# Patient Record
Sex: FEMALE | Race: WHITE | NOT HISPANIC OR LATINO | URBAN - METROPOLITAN AREA
[De-identification: names, ages, dates, MRNs, and addresses within clinical notes are randomized per-mention and may not be internally consistent; named-entity substitution may affect disease eponyms.]

---

## 2018-05-23 ENCOUNTER — OUTPATIENT (OUTPATIENT)
Dept: OUTPATIENT SERVICES | Facility: HOSPITAL | Age: 63
LOS: 1 days | End: 2018-05-23

## 2018-05-24 ENCOUNTER — APPOINTMENT (OUTPATIENT)
Dept: CARDIOLOGY | Facility: CLINIC | Age: 63
End: 2018-05-24
Payer: COMMERCIAL

## 2018-05-24 VITALS
WEIGHT: 128 LBS | BODY MASS INDEX: 25.13 KG/M2 | HEIGHT: 60 IN | DIASTOLIC BLOOD PRESSURE: 88 MMHG | SYSTOLIC BLOOD PRESSURE: 148 MMHG | HEART RATE: 60 BPM

## 2018-05-24 DIAGNOSIS — Z83.79 FAMILY HISTORY OF OTHER DISEASES OF THE DIGESTIVE SYSTEM: ICD-10-CM

## 2018-05-24 DIAGNOSIS — Z78.9 OTHER SPECIFIED HEALTH STATUS: ICD-10-CM

## 2018-05-24 DIAGNOSIS — Z87.891 PERSONAL HISTORY OF NICOTINE DEPENDENCE: ICD-10-CM

## 2018-05-24 DIAGNOSIS — Z82.5 FAMILY HISTORY OF ASTHMA AND OTHER CHRONIC LOWER RESPIRATORY DISEASES: ICD-10-CM

## 2018-05-24 DIAGNOSIS — Z80.7 FAMILY HISTORY OF OTHER MALIGNANT NEOPLASMS OF LYMPHOID, HEMATOPOIETIC AND RELATED TISSUES: ICD-10-CM

## 2018-05-24 DIAGNOSIS — Z81.8 FAMILY HISTORY OF OTHER MENTAL AND BEHAVIORAL DISORDERS: ICD-10-CM

## 2018-05-24 PROCEDURE — 99214 OFFICE O/P EST MOD 30 MIN: CPT

## 2018-05-29 ENCOUNTER — OUTPATIENT (OUTPATIENT)
Dept: OUTPATIENT SERVICES | Facility: HOSPITAL | Age: 63
LOS: 1 days | End: 2018-05-29

## 2018-05-31 ENCOUNTER — APPOINTMENT (OUTPATIENT)
Dept: CARDIOLOGY | Facility: CLINIC | Age: 63
End: 2018-05-31
Payer: COMMERCIAL

## 2018-05-31 PROCEDURE — 93306 TTE W/DOPPLER COMPLETE: CPT

## 2018-05-31 PROCEDURE — 93880 EXTRACRANIAL BILAT STUDY: CPT

## 2018-06-01 ENCOUNTER — OUTPATIENT (OUTPATIENT)
Dept: OUTPATIENT SERVICES | Facility: HOSPITAL | Age: 63
LOS: 1 days | End: 2018-06-01

## 2018-06-11 ENCOUNTER — MEDICATION RENEWAL (OUTPATIENT)
Age: 63
End: 2018-06-11

## 2018-09-05 ENCOUNTER — APPOINTMENT (OUTPATIENT)
Dept: CARDIOLOGY | Facility: CLINIC | Age: 63
End: 2018-09-05
Payer: COMMERCIAL

## 2018-09-05 ENCOUNTER — RECORD ABSTRACTING (OUTPATIENT)
Age: 63
End: 2018-09-05

## 2018-09-05 VITALS
OXYGEN SATURATION: 97 % | HEART RATE: 75 BPM | DIASTOLIC BLOOD PRESSURE: 82 MMHG | HEIGHT: 60 IN | BODY MASS INDEX: 25.13 KG/M2 | SYSTOLIC BLOOD PRESSURE: 152 MMHG | WEIGHT: 128 LBS

## 2018-09-05 PROCEDURE — 99214 OFFICE O/P EST MOD 30 MIN: CPT

## 2018-09-05 PROCEDURE — 93000 ELECTROCARDIOGRAM COMPLETE: CPT

## 2018-09-06 ENCOUNTER — OUTPATIENT (OUTPATIENT)
Dept: OUTPATIENT SERVICES | Facility: HOSPITAL | Age: 63
LOS: 1 days | End: 2018-09-06

## 2018-09-10 ENCOUNTER — OUTPATIENT (OUTPATIENT)
Dept: OUTPATIENT SERVICES | Facility: HOSPITAL | Age: 63
LOS: 1 days | End: 2018-09-10

## 2018-09-12 ENCOUNTER — APPOINTMENT (OUTPATIENT)
Dept: CARDIOLOGY | Facility: CLINIC | Age: 63
End: 2018-09-12
Payer: COMMERCIAL

## 2018-09-14 PROCEDURE — 93224 XTRNL ECG REC UP TO 48 HRS: CPT

## 2018-09-17 ENCOUNTER — APPOINTMENT (OUTPATIENT)
Dept: CARDIOLOGY | Facility: CLINIC | Age: 63
End: 2018-09-17

## 2018-09-18 ENCOUNTER — RECORD ABSTRACTING (OUTPATIENT)
Age: 63
End: 2018-09-18

## 2018-09-18 ENCOUNTER — APPOINTMENT (OUTPATIENT)
Dept: CARDIOLOGY | Facility: CLINIC | Age: 63
End: 2018-09-18
Payer: COMMERCIAL

## 2018-09-18 VITALS
SYSTOLIC BLOOD PRESSURE: 120 MMHG | BODY MASS INDEX: 25.52 KG/M2 | OXYGEN SATURATION: 97 % | HEART RATE: 70 BPM | WEIGHT: 130 LBS | HEIGHT: 60 IN | DIASTOLIC BLOOD PRESSURE: 80 MMHG

## 2018-09-18 DIAGNOSIS — M25.552 PAIN IN LEFT HIP: ICD-10-CM

## 2018-09-18 PROCEDURE — 99214 OFFICE O/P EST MOD 30 MIN: CPT | Mod: 25

## 2018-09-18 PROCEDURE — 93015 CV STRESS TEST SUPVJ I&R: CPT

## 2018-09-18 PROCEDURE — A9502: CPT

## 2018-09-18 PROCEDURE — 78452 HT MUSCLE IMAGE SPECT MULT: CPT

## 2018-09-20 ENCOUNTER — APPOINTMENT (OUTPATIENT)
Dept: CARDIOLOGY | Facility: CLINIC | Age: 63
End: 2018-09-20
Payer: COMMERCIAL

## 2018-09-24 ENCOUNTER — OUTPATIENT (OUTPATIENT)
Dept: OUTPATIENT SERVICES | Facility: HOSPITAL | Age: 63
LOS: 1 days | End: 2018-09-24

## 2018-09-24 ENCOUNTER — INPATIENT (INPATIENT)
Facility: HOSPITAL | Age: 63
LOS: 2 days | Discharge: HOME HEALTH | End: 2018-09-27
Payer: COMMERCIAL

## 2018-09-24 PROCEDURE — 73501 X-RAY EXAM HIP UNI 1 VIEW: CPT | Mod: 26,LT

## 2018-09-25 ENCOUNTER — OUTPATIENT (OUTPATIENT)
Dept: OUTPATIENT SERVICES | Facility: HOSPITAL | Age: 63
LOS: 1 days | End: 2018-09-25

## 2018-09-26 ENCOUNTER — OUTPATIENT (OUTPATIENT)
Dept: OUTPATIENT SERVICES | Facility: HOSPITAL | Age: 63
LOS: 1 days | End: 2018-09-26

## 2018-09-27 ENCOUNTER — OUTPATIENT (OUTPATIENT)
Dept: OUTPATIENT SERVICES | Facility: HOSPITAL | Age: 63
LOS: 1 days | End: 2018-09-27

## 2018-10-16 ENCOUNTER — OUTPATIENT (OUTPATIENT)
Dept: OUTPATIENT SERVICES | Facility: HOSPITAL | Age: 63
LOS: 1 days | Discharge: HOME | End: 2018-10-16

## 2019-01-04 ENCOUNTER — RX RENEWAL (OUTPATIENT)
Age: 64
End: 2019-01-04

## 2019-05-20 ENCOUNTER — APPOINTMENT (OUTPATIENT)
Dept: RADIOLOGY | Facility: CLINIC | Age: 64
End: 2019-05-20
Payer: COMMERCIAL

## 2019-05-20 PROCEDURE — 72050 X-RAY EXAM NECK SPINE 4/5VWS: CPT

## 2019-05-23 ENCOUNTER — TRANSCRIPTION ENCOUNTER (OUTPATIENT)
Age: 64
End: 2019-05-23

## 2019-10-22 ENCOUNTER — APPOINTMENT (OUTPATIENT)
Dept: CARDIOLOGY | Facility: CLINIC | Age: 64
End: 2019-10-22
Payer: MEDICARE

## 2019-10-22 ENCOUNTER — NON-APPOINTMENT (OUTPATIENT)
Age: 64
End: 2019-10-22

## 2019-10-22 VITALS
SYSTOLIC BLOOD PRESSURE: 130 MMHG | HEART RATE: 54 BPM | DIASTOLIC BLOOD PRESSURE: 80 MMHG | WEIGHT: 125 LBS | HEIGHT: 60 IN | BODY MASS INDEX: 24.54 KG/M2

## 2019-10-22 PROCEDURE — 93000 ELECTROCARDIOGRAM COMPLETE: CPT

## 2019-10-22 PROCEDURE — 99214 OFFICE O/P EST MOD 30 MIN: CPT

## 2019-10-22 NOTE — REASON FOR VISIT
[Follow-Up - Clinic] : a clinic follow-up of [FreeTextEntry2] : HTN, chronic hip pain left NASIMA Sept 2018

## 2019-10-22 NOTE — PHYSICAL EXAM
[General Appearance - Well Developed] : well developed [Normal Appearance] : normal appearance [Well Groomed] : well groomed [General Appearance - Well Nourished] : well nourished [No Deformities] : no deformities [General Appearance - In No Acute Distress] : no acute distress [Normal Conjunctiva] : the conjunctiva exhibited no abnormalities [Eyelids - No Xanthelasma] : the eyelids demonstrated no xanthelasmas [Normal Oral Mucosa] : normal oral mucosa [No Oral Pallor] : no oral pallor [No Oral Cyanosis] : no oral cyanosis [Respiration, Rhythm And Depth] : normal respiratory rhythm and effort [Exaggerated Use Of Accessory Muscles For Inspiration] : no accessory muscle use [Auscultation Breath Sounds / Voice Sounds] : lungs were clear to auscultation bilaterally [Heart Rate And Rhythm] : heart rate and rhythm were normal [Heart Sounds] : normal S1 and S2 [Murmurs] : no murmurs present [Abnormal Walk] : normal gait [Gait - Sufficient For Exercise Testing] : the gait was sufficient for exercise testing [Nail Clubbing] : no clubbing of the fingernails [Cyanosis, Localized] : no localized cyanosis [Petechial Hemorrhages (___cm)] : no petechial hemorrhages [] : no rash [Skin Color & Pigmentation] : normal skin color and pigmentation [No Venous Stasis] : no venous stasis [Skin Lesions] : no skin lesions [No Xanthoma] : no  xanthoma was observed [No Skin Ulcers] : no skin ulcer [Oriented To Time, Place, And Person] : oriented to person, place, and time [Affect] : the affect was normal [Mood] : the mood was normal [No Anxiety] : not feeling anxious [FreeTextEntry1] : diminished carotid impulses.

## 2019-10-22 NOTE — HISTORY OF PRESENT ILLNESS
[FreeTextEntry1] : Kathrin is a 64-year-old female with history of hypertension on amlodipine and Toprol, chronic hip pain left NASIMA September 2018, scoliosis.\par \par Cardiovascular review of symptoms is negative for exertional chest pain, dyspnea, palpitations, dizziness or syncope.  No PND or orthopnea leg edema.  No bleeding or black stool.\par \par Patient has hip and back pain which limits her exercise capacity.  Patient is walking 15 minutes with exertional chest pain.\par \par Lexascan Myoview stress test September 2018, LVEF 62% normal perfusion, no ischemic EKG response, no chest pain, baseline sinus rhythm PRWP, NSST\par \par Echocardiogram May 2018, LVEF 65% mild diastolic dysfunction normal Doppler, normal RVSP\par \par Carotid Doppler May 2018 mild nonobstructive plaque\par \par

## 2019-10-22 NOTE — ADDENDUM
[FreeTextEntry1] : Patient had echocardiography and carotid sonography today, pulmonary this reveals normal LV function no significant valvular heart disease and mild bilateral carotid atherosclerosis. As such she should proceed with her planned surgery as detailed above.

## 2019-10-22 NOTE — ASSESSMENT
[FreeTextEntry1] : \ha Pinon is a 64-year-old female with medical history detailed above and active medical issues including:\par \par -No exertional symptoms, normal perfusion Myoview stress test normal LVEF September 2018\par \par - Hypertension at BP goal <130/80 on amlodipine and Toprol.  Medication renewal today, repeat labs ordered.\par \par - Chronic back pain, scoliosis, limited aerobic activity\par \par - Left NASIMA September 2018\par \par Advised patient to follow active lifestyle with regular cardiovascular exercise. Patient educated on lifestyle and diet modification with low sodium low fat diet and avoidance of excessive alcohol. Patient is aware to call with any symptoms or concerns. \par \par Patient will be seen in cardiology followup in one year at patient request. \par \ha Pinon will followup with Calin WETSON for primary care.  \par \par

## 2019-10-23 ENCOUNTER — OUTPATIENT (OUTPATIENT)
Dept: OUTPATIENT SERVICES | Facility: HOSPITAL | Age: 64
LOS: 1 days | End: 2019-10-23
Payer: MEDICARE

## 2019-10-23 PROCEDURE — 72148 MRI LUMBAR SPINE W/O DYE: CPT | Mod: 26

## 2019-11-06 ENCOUNTER — OUTPATIENT (OUTPATIENT)
Dept: OUTPATIENT SERVICES | Facility: HOSPITAL | Age: 64
LOS: 1 days | End: 2019-11-06

## 2020-01-09 ENCOUNTER — APPOINTMENT (OUTPATIENT)
Dept: CARDIOLOGY | Facility: CLINIC | Age: 65
End: 2020-01-09
Payer: MEDICARE

## 2020-01-09 PROCEDURE — 93306 TTE W/DOPPLER COMPLETE: CPT

## 2020-10-13 ENCOUNTER — APPOINTMENT (OUTPATIENT)
Dept: CARDIOLOGY | Facility: CLINIC | Age: 65
End: 2020-10-13
Payer: MEDICARE

## 2020-10-27 ENCOUNTER — NON-APPOINTMENT (OUTPATIENT)
Age: 65
End: 2020-10-27

## 2020-10-27 ENCOUNTER — APPOINTMENT (OUTPATIENT)
Dept: CARDIOLOGY | Facility: CLINIC | Age: 65
End: 2020-10-27
Payer: MEDICARE

## 2020-10-27 VITALS
SYSTOLIC BLOOD PRESSURE: 142 MMHG | OXYGEN SATURATION: 97 % | TEMPERATURE: 97.5 F | HEART RATE: 71 BPM | DIASTOLIC BLOOD PRESSURE: 84 MMHG | WEIGHT: 126 LBS | BODY MASS INDEX: 24.74 KG/M2 | HEIGHT: 60 IN

## 2020-10-27 PROCEDURE — 93000 ELECTROCARDIOGRAM COMPLETE: CPT

## 2020-10-27 NOTE — HISTORY OF PRESENT ILLNESS
[FreeTextEntry1] : Kathrin is a 65-year-old female with history of hypertension on amlodipine and Toprol, chronic hip pain left NASIMA September 2018, scoliosis.\par \par Cardiovascular review of symptoms is negative for exertional chest pain, dyspnea, palpitations, dizziness or syncope.  No PND or orthopnea leg edema.  No bleeding or black stool.\par \par Patient has hip and back pain which limits her exercise capacity.  Patient is walking 15 minutes with exertional chest pain. 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  \par \par Echocardiogram Jan 2020, LVEF 60 to 65%, mild diastolic dysfunction, normal wall thickness, mild MR, normal RVSP\par \par Lexascan Myoview stress test September 2018, LVEF 62% normal perfusion, no ischemic EKG response, no chest pain, baseline sinus rhythm PRWP, NSST\par \par Echocardiogram May 2018, LVEF 65% mild diastolic dysfunction normal Doppler, normal RVSP\par \par Carotid Doppler May 2018 mild nonobstructive plaque\par \par

## 2020-10-27 NOTE — PHYSICAL EXAM
[General Appearance - Well Developed] : well developed [Normal Appearance] : normal appearance [Well Groomed] : well groomed [General Appearance - Well Nourished] : well nourished [No Deformities] : no deformities [General Appearance - In No Acute Distress] : no acute distress [Normal Conjunctiva] : the conjunctiva exhibited no abnormalities [Eyelids - No Xanthelasma] : the eyelids demonstrated no xanthelasmas [Normal Oral Mucosa] : normal oral mucosa [No Oral Pallor] : no oral pallor [No Oral Cyanosis] : no oral cyanosis [Respiration, Rhythm And Depth] : normal respiratory rhythm and effort [Exaggerated Use Of Accessory Muscles For Inspiration] : no accessory muscle use [Auscultation Breath Sounds / Voice Sounds] : lungs were clear to auscultation bilaterally [Heart Rate And Rhythm] : heart rate and rhythm were normal [Heart Sounds] : normal S1 and S2 [Murmurs] : no murmurs present [Abnormal Walk] : normal gait [Gait - Sufficient For Exercise Testing] : the gait was sufficient for exercise testing [Nail Clubbing] : no clubbing of the fingernails [Cyanosis, Localized] : no localized cyanosis [Petechial Hemorrhages (___cm)] : no petechial hemorrhages [Skin Color & Pigmentation] : normal skin color and pigmentation [] : no rash [No Venous Stasis] : no venous stasis [Skin Lesions] : no skin lesions [No Skin Ulcers] : no skin ulcer [No Xanthoma] : no  xanthoma was observed [Oriented To Time, Place, And Person] : oriented to person, place, and time [Affect] : the affect was normal [Mood] : the mood was normal [No Anxiety] : not feeling anxious [FreeTextEntry1] : diminished carotid impulses.

## 2020-10-27 NOTE — ASSESSMENT
[FreeTextEntry1] : Kathrin is a 65-year-old female with medical history detailed above and active medical issues including:\par \par - No exertional symptoms, normal perfusion Myoview stress test normal LVEF September 2018.  In the setting of Covid 19 pandemic we will discuss the need for stress testing next office visit.\par \par - Hypertension Patient will follow home BPs daily over the next week and call office to confirm average home BPs at goal on amlodipine and Toprol.  Medication renewal today, repeat labs ordered.\par \par - Chronic back pain, scoliosis, limited aerobic activity\par \par - Left NASIMA September 2018\par \par Advised patient to follow active lifestyle with regular cardiovascular exercise. Patient educated on lifestyle and diet modification with low sodium low fat diet and avoidance of excessive alcohol. Patient is aware to call with any symptoms or concerns. \par \par Patient will be seen in cardiology followup in one year at patient request. \par \par Kathrin will followup with Calin WESTON for primary care.  \par \par

## 2021-03-26 ENCOUNTER — TRANSCRIPTION ENCOUNTER (OUTPATIENT)
Age: 66
End: 2021-03-26

## 2021-04-08 ENCOUNTER — APPOINTMENT (OUTPATIENT)
Dept: CARDIOLOGY | Facility: CLINIC | Age: 66
End: 2021-04-08
Payer: MEDICARE

## 2021-04-08 PROCEDURE — 93880 EXTRACRANIAL BILAT STUDY: CPT

## 2021-04-08 PROCEDURE — 93979 VASCULAR STUDY: CPT

## 2021-04-08 PROCEDURE — 93306 TTE W/DOPPLER COMPLETE: CPT

## 2021-04-14 ENCOUNTER — APPOINTMENT (OUTPATIENT)
Dept: CARDIOLOGY | Facility: CLINIC | Age: 66
End: 2021-04-14

## 2021-04-21 ENCOUNTER — NON-APPOINTMENT (OUTPATIENT)
Age: 66
End: 2021-04-21

## 2021-11-12 ENCOUNTER — NON-APPOINTMENT (OUTPATIENT)
Age: 66
End: 2021-11-12

## 2021-11-12 ENCOUNTER — APPOINTMENT (OUTPATIENT)
Dept: CARDIOLOGY | Facility: CLINIC | Age: 66
End: 2021-11-12
Payer: MEDICARE

## 2021-11-12 VITALS
OXYGEN SATURATION: 98 % | BODY MASS INDEX: 24.54 KG/M2 | HEIGHT: 60 IN | HEART RATE: 66 BPM | WEIGHT: 125 LBS | DIASTOLIC BLOOD PRESSURE: 72 MMHG | SYSTOLIC BLOOD PRESSURE: 154 MMHG

## 2021-11-12 DIAGNOSIS — Z87.898 PERSONAL HISTORY OF OTHER SPECIFIED CONDITIONS: ICD-10-CM

## 2021-11-12 DIAGNOSIS — Z00.00 ENCOUNTER FOR GENERAL ADULT MEDICAL EXAMINATION W/OUT ABNORMAL FINDINGS: ICD-10-CM

## 2021-11-12 PROCEDURE — 99215 OFFICE O/P EST HI 40 MIN: CPT

## 2021-11-12 RX ORDER — MESALAMINE 4 G/60ML
4 ENEMA RECTAL
Qty: 1680 | Refills: 0 | Status: DISCONTINUED | COMMUNITY
Start: 2020-05-20 | End: 2021-11-12

## 2021-11-12 NOTE — ASSESSMENT
[FreeTextEntry1] : Prior cardiovascular test\par Echocardiogram Jan 2020, LVEF 60 to 65%, mild diastolic dysfunction, normal wall thickness, mild MR, normal RVSP\par \par Lexascan Myoview stress test September 2018, LVEF 62% normal perfusion, no ischemic EKG response, no chest pain, baseline sinus rhythm PRWP, NSST\par \par Echocardiogram May 2018, LVEF 65% mild diastolic dysfunction normal Doppler, normal RVSP\par \par Carotid Doppler May 2018 mild nonobstructive plaque\par \par Reviewed November 12, 2021\par Echocardiogram, carotid Doppler study, abdominal aortic ultrasound from April 8, 2021 and labs from January 26, 2021 were reviewed\par \par \par \par

## 2021-11-12 NOTE — PHYSICAL EXAM
[Well Developed] : well developed [Well Nourished] : well nourished [No Acute Distress] : no acute distress [Normal Conjunctiva] : normal conjunctiva [Normal Venous Pressure] : normal venous pressure [No Carotid Bruit] : no carotid bruit [Normal S1, S2] : normal S1, S2 [No Murmur] : no murmur [No Rub] : no rub [No Gallop] : no gallop [Clear Lung Fields] : clear lung fields [Good Air Entry] : good air entry [No Respiratory Distress] : no respiratory distress  [Soft] : abdomen soft [Non Tender] : non-tender [No Masses/organomegaly] : no masses/organomegaly [Normal Bowel Sounds] : normal bowel sounds [Normal Gait] : normal gait [No Edema] : no edema [No Cyanosis] : no cyanosis [No Clubbing] : no clubbing [No Varicosities] : no varicosities [Normal Radial B/L] : normal radial B/L [Normal DP B/L] : normal DP B/L [No Rash] : no rash [No Skin Lesions] : no skin lesions [Moves all extremities] : moves all extremities [No Focal Deficits] : no focal deficits [Normal Speech] : normal speech [Alert and Oriented] : alert and oriented [Normal memory] : normal memory

## 2021-11-12 NOTE — HISTORY OF PRESENT ILLNESS
[FreeTextEntry1] : Kathrin is a 66-year-old female with history of hypertension on amlodipine and Toprol, chronic hip pain left NASIMA September 2018, scoliosis.\par \par Cardiovascular review of symptoms is negative for exertional chest pain, dyspnea, palpitations, dizziness or syncope.  No PND or orthopnea leg edema.  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

## 2021-11-12 NOTE — DISCUSSION/SUMMARY
[FreeTextEntry1] : Kathrin is a 66-year-old female with medical history detailed above and active medical issues including:\par \par \par - Hypertension.  Essential.  No history of CHF.  No CKD.  Non-smoker.  Elevated to office visit in a row.  Goal less than 130/80.  Increase amlodipine 10 mg as she does not want to change the medications.  Side effects reviewed.  She will contact us immediately if there are any significant side effects which includes dizziness lightheadedness ankle edema etc.  In that case she would benefit from amlodipine/ACE inhibitor.\par Low-salt diet.  Lower alcohol intake to 1 drink a day instead of 2 drinks a day\par Continue to stay active.  Labs ordered.\par \par -Mixed dyslipidemia P elevated triglycerides be elevated LDL cholesterol.  Low carbohydrate diet reviewed.  Repeat labs ordered.\par \par -Recommended to have primary care physician.\par \par \par Counseling regarding low saturated fat, salt and carbohydrate intake was reviewed. Active lifestyle and regular. Exercise along with weight management is advised.\par All the above were at length reviewed. Answered all the questions. Thank you very much for this kind referral. Please do not hesitate to give me a call for any question.\par Part of this transcription was done with voice recognition software and phonetically similar errors are common. I apologize for that. Please do not hesitate to call for any questions due to above.\par Follow-up 6 months

## 2021-11-12 NOTE — CARDIOLOGY SUMMARY
[de-identified] : April 8, 2021 LV ejection fraction 60 to 65% mild mitral regurgitation mildly dilated left atrium RVSP 16 [de-identified] : Bilateral carotid Doppler study April 8, 2021.  Mild nonobstructive disease\par Abdominal aortic ultrasound April 8, 2021 no significant aneurysm.

## 2022-04-19 ENCOUNTER — APPOINTMENT (OUTPATIENT)
Dept: CARDIOLOGY | Facility: CLINIC | Age: 67
End: 2022-04-19

## 2022-06-21 ENCOUNTER — APPOINTMENT (OUTPATIENT)
Dept: CARDIOLOGY | Facility: CLINIC | Age: 67
End: 2022-06-21
Payer: MEDICARE

## 2022-06-21 VITALS
HEIGHT: 60 IN | DIASTOLIC BLOOD PRESSURE: 70 MMHG | OXYGEN SATURATION: 96 % | BODY MASS INDEX: 25.13 KG/M2 | WEIGHT: 128 LBS | HEART RATE: 66 BPM | SYSTOLIC BLOOD PRESSURE: 128 MMHG

## 2022-06-21 DIAGNOSIS — I51.7 CARDIOMEGALY: ICD-10-CM

## 2022-06-21 PROCEDURE — 99214 OFFICE O/P EST MOD 30 MIN: CPT

## 2022-06-21 NOTE — DISCUSSION/SUMMARY
[FreeTextEntry1] : Kathrin is a 66-year-old female with medical history detailed above and active medical issues including:\par \par - Hypertension.  Essential.  No history of CHF.  No CKD.  Non-smoker.  Much better controlled.  Side effects reviewed.  Goal less than 130/80.\par Low-salt diet.  Lower alcohol intake to 1 drink a day instead of 2 drinks a day\par Continue to stay active.  Labs ordered.\par \par -Mixed dyslipidemia improved triglycerides but increased LDL cholesterol.  Good HDL cholesterol.  Reviewed role of statin therapy.  She declines.  She agrees with coronary calcium score.  If abnormal she will consider statin therapy.  Risk benefits alternatives of coronary calcium score discussed.\par \par -Vitamin D deficiency.  Vitamin D oral supplement recommended.  Repeat vitamin D level in 3 months\par \par -Recommended to have primary care physician/gynecological follow-up.\par \par \par Counseling regarding low saturated fat, salt and carbohydrate intake was reviewed. Active lifestyle and regular. Exercise along with weight management is advised.\par All the above were at length reviewed. Answered all the questions. Thank you very much for this kind referral. Please do not hesitate to give me a call for any question.\par Part of this transcription was done with voice recognition software and phonetically similar errors are common. I apologize for that. Please do not hesitate to call for any questions due to above.\par \par Sincerely,\par Anika Hallman MD,FACC,GI\par

## 2022-06-21 NOTE — CARDIOLOGY SUMMARY
[de-identified] : April 8, 2021 LV ejection fraction 60 to 65% mild mitral regurgitation mildly dilated left atrium RVSP 16 [de-identified] : Bilateral carotid Doppler study April 8, 2021.  Mild nonobstructive disease\par Abdominal aortic ultrasound April 8, 2021 no significant aneurysm.

## 2022-06-21 NOTE — HISTORY OF PRESENT ILLNESS
[FreeTextEntry1] : Kathrin is a 66-year-old female with history of hypertension on amlodipine and Toprol, chronic hip pain left NASIMA September 2018, scoliosis.  This lipidemia.  Not on statin therapy.\par \par Cardiovascular review of symptoms is negative for exertional chest pain, dyspnea, palpitations, dizziness or syncope.  No PND or orthopnea leg edema.  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

## 2022-06-21 NOTE — ASSESSMENT
[FreeTextEntry1] : Prior cardiovascular test\par Echocardiogram Jan 2020, LVEF 60 to 65%, mild diastolic dysfunction, normal wall thickness, mild MR, normal RVSP\par \par Lexascan Myoview stress test September 2018, LVEF 62% normal perfusion, no ischemic EKG response, no chest pain, baseline sinus rhythm PRWP, NSST\par \par Echocardiogram May 2018, LVEF 65% mild diastolic dysfunction normal Doppler, normal RVSP\par \par Carotid Doppler May 2018 mild nonobstructive plaque\par \par Reviewed November 12, 2021\par Echocardiogram, carotid Doppler study, abdominal aortic ultrasound from April 8, 2021 and labs from January 26, 2021 were reviewed\par \par Reviewed on June 21, 2022\par Labs from May 26, 2022 reviewed.  Stable CBC CMP with sodium 137 potassium 5.1 creatinine 0.48  HDL 77 triglycerides 77 hemoglobin A1c 5.5 vitamin D level 24 vitamin B12 TSH normal urinalysis asymptomatic

## 2022-06-21 NOTE — PHYSICAL EXAM
[Well Developed] : well developed [Well Nourished] : well nourished [No Acute Distress] : no acute distress [Normal Venous Pressure] : normal venous pressure [No Carotid Bruit] : no carotid bruit [Normal S1, S2] : normal S1, S2 [No Murmur] : no murmur [No Rub] : no rub [No Gallop] : no gallop [Clear Lung Fields] : clear lung fields [Good Air Entry] : good air entry [No Respiratory Distress] : no respiratory distress  [Soft] : abdomen soft [Normal Gait] : normal gait [No Edema] : no edema [No Cyanosis] : no cyanosis [No Clubbing] : no clubbing [No Varicosities] : no varicosities [Normal Radial B/L] : normal radial B/L [Normal DP B/L] : normal DP B/L [Moves all extremities] : moves all extremities [Normal Speech] : normal speech [Alert and Oriented] : alert and oriented

## 2022-12-13 ENCOUNTER — NON-APPOINTMENT (OUTPATIENT)
Age: 67
End: 2022-12-13

## 2022-12-28 ENCOUNTER — APPOINTMENT (OUTPATIENT)
Dept: CARDIOLOGY | Facility: CLINIC | Age: 67
End: 2022-12-28
Payer: MEDICARE

## 2022-12-28 ENCOUNTER — NON-APPOINTMENT (OUTPATIENT)
Age: 67
End: 2022-12-28

## 2022-12-28 VITALS
BODY MASS INDEX: 23.56 KG/M2 | OXYGEN SATURATION: 98 % | DIASTOLIC BLOOD PRESSURE: 68 MMHG | HEIGHT: 60 IN | SYSTOLIC BLOOD PRESSURE: 134 MMHG | HEART RATE: 69 BPM | WEIGHT: 120 LBS

## 2022-12-28 DIAGNOSIS — E55.9 VITAMIN D DEFICIENCY, UNSPECIFIED: ICD-10-CM

## 2022-12-28 PROCEDURE — 93000 ELECTROCARDIOGRAM COMPLETE: CPT

## 2022-12-28 PROCEDURE — 99214 OFFICE O/P EST MOD 30 MIN: CPT

## 2023-02-06 ENCOUNTER — NON-APPOINTMENT (OUTPATIENT)
Age: 68
End: 2023-02-06

## 2023-02-06 NOTE — HISTORY OF PRESENT ILLNESS
[FreeTextEntry1] : Kathrin is a 67-year-old female with history of hypertension on amlodipine and Toprol, chronic hip pain left NASIMA September 2018, scoliosis.  Dyslipidemia not on statin therapy.  Recent ER visit for abdominal pain in relation to ulcerative colitis.  Improving gradually\par \par Cardiovascular review of symptoms is negative for exertional chest pain, dyspnea, palpitations, dizziness or syncope.  No PND or orthopnea leg edema.  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

## 2023-02-06 NOTE — CARDIOLOGY SUMMARY
[de-identified] : December 28, 2022 normal sinus rhythm LHP poor R wave progression versus anterior infarct of undetermined age [de-identified] : April 8, 2021 LV ejection fraction 60 to 65% mild mitral regurgitation mildly dilated left atrium RVSP 16 [de-identified] : December 7, 2022.  Mild coronary calcification CCS score 36.  Mild fibrocalcific disease of mitral valve. [de-identified] : Bilateral carotid Doppler study April 8, 2021.  Mild nonobstructive disease\par Abdominal aortic ultrasound April 8, 2021 no significant aneurysm.

## 2023-02-06 NOTE — ADDENDUM
[FreeTextEntry1] : At present, there are no active cardiac conditions.\par I am asked to do preoperative cardiac assessment addendum prior to bunionectomy.  There is no clinical change since last seen.\par No recent unstable coronary syndrome, decompensated heart failure, severe valvular heart disease or significant dysrhythmias.\par The clinical benefit of the proposed procedure outweighs the associated cardiovascular risk.\par Risk not attenuated with further cardiovascular testing.\par Prior testing as outlined above.\par Optimized from a cardiovascular perspective.\par Control blood pressure, heart rate, pulse oximetry perioperatively.\par If required appropriate intravenous medication for the outpatient oral medication for blood pressure, heart rate control.\par DVT prophylaxis as per indication.

## 2023-02-06 NOTE — DISCUSSION/SUMMARY
[FreeTextEntry1] : Kathrin is a 67-year-old female with medical history detailed above and active medical issues including:\par -Coronary calcium score reviewed.  Mild coronary calcification noted.  Evidence of atherosclerosis discussed.\par Atherosclerosis: From the available data evidence of atherosclerosis was reviewed.  We have discussed atherosclerosis is a generalized process.  The pathophysiology of the atherosclerosis  and associated morbidity mortality were reviewed.  Lifestyle modification and risk factors associated with atherosclerosis were discussed.  changes needed in lifestyle and risk factors were reviewed at length.  Understands prevention is the best way  in decreasing morbidity mortality and overall improving long-term prognosis.\par \par Reviewed risk benefits alternatives of statin therapy.  Side effects reviewed.  She will start at low-dose of rosuvastatin 10 mg.  If any side effects she will stop it and let us know.  Otherwise labs will be done in about 6 to 8 weeks.\par \par - Hypertension.  Essential.  No history of CHF.  No CKD.  Non-smoker.  Much better controlled.  Side effects reviewed.  Goal less than 130/80.\par Low-salt diet.  Lower alcohol intake to 1 drink a day instead of 2 drinks a day\par Continue to stay active.  Labs ordered.\par \par -Mixed dyslipidemia statin therapy as discussed above in presence of abnormal but low coronary calcium score\par \par -Vitamin D deficiency.  Vitamin D oral supplement recommended.  Follow-up vitamin D level\par \par -Recommended to have primary care physician/gynecological follow-up.\par \par \par Counseling regarding low saturated fat, salt and carbohydrate intake was reviewed. Active lifestyle and regular. Exercise along with weight management is advised.\par All the above were at length reviewed. Answered all the questions. Thank you very much for this kind referral. Please do not hesitate to give me a call for any question.\par Part of this transcription was done with voice recognition software and phonetically similar errors are common. I apologize for that. Please do not hesitate to call for any questions due to above.\par \par Sincerely,\par Anika Hallman MD,FACC,GI\par

## 2023-02-06 NOTE — ASSESSMENT
[FreeTextEntry1] : Prior cardiovascular test\par Echocardiogram Jan 2020, LVEF 60 to 65%, mild diastolic dysfunction, normal wall thickness, mild MR, normal RVSP\par \par Lexascan Myoview stress test September 2018, LVEF 62% normal perfusion, no ischemic EKG response, no chest pain, baseline sinus rhythm PRWP, NSST\par \par Echocardiogram May 2018, LVEF 65% mild diastolic dysfunction normal Doppler, normal RVSP\par \par Carotid Doppler May 2018 mild nonobstructive plaque\par \par Reviewed November 12, 2021\par Echocardiogram, carotid Doppler study, abdominal aortic ultrasound from April 8, 2021 and labs from January 26, 2021 were reviewed\par \par Reviewed on June 21, 2022\par Labs from May 26, 2022 reviewed.  Stable CBC CMP with sodium 137 potassium 5.1 creatinine 0.48  HDL 77 triglycerides 77 hemoglobin A1c 5.5 vitamin D level 24 vitamin B12 TSH normal urinalysis asymptomatic\par Reviewed on December 28, 2022.\par Labs from November 25, 2022 reviewed\par Coronary calcium score reviewed\par EKG December 28, 2022 as noted above

## 2023-05-30 ENCOUNTER — NON-APPOINTMENT (OUTPATIENT)
Age: 68
End: 2023-05-30

## 2023-05-30 RX ORDER — UBIDECARENONE/VIT E ACET 100MG-5
50 MCG CAPSULE ORAL DAILY
Refills: 0 | Status: ACTIVE | COMMUNITY
Start: 2023-05-30

## 2023-06-26 ENCOUNTER — APPOINTMENT (OUTPATIENT)
Dept: CARDIOLOGY | Facility: CLINIC | Age: 68
End: 2023-06-26
Payer: MEDICARE

## 2023-06-26 VITALS
DIASTOLIC BLOOD PRESSURE: 68 MMHG | HEIGHT: 60 IN | OXYGEN SATURATION: 100 % | HEART RATE: 65 BPM | WEIGHT: 125 LBS | BODY MASS INDEX: 24.54 KG/M2 | SYSTOLIC BLOOD PRESSURE: 120 MMHG

## 2023-06-26 DIAGNOSIS — I10 ESSENTIAL (PRIMARY) HYPERTENSION: ICD-10-CM

## 2023-06-26 PROCEDURE — 99214 OFFICE O/P EST MOD 30 MIN: CPT

## 2023-06-26 RX ORDER — MESALAMINE 1.2 G/1
1.2 TABLET, DELAYED RELEASE ORAL DAILY
Refills: 0 | Status: ACTIVE | COMMUNITY
Start: 2020-09-11

## 2023-06-26 RX ORDER — ASCORBIC ACID 125 MG
3000 TABLET,CHEWABLE ORAL
Refills: 0 | Status: ACTIVE | COMMUNITY

## 2023-06-26 NOTE — ASSESSMENT
[FreeTextEntry1] : Reviewed on June 26, 2023.\par Labs from May 26, 2023 reviewed.    CMP stable.  Vitamin D level low

## 2023-06-26 NOTE — DISCUSSION/SUMMARY
[FreeTextEntry1] : Kathrin is a 67-year-old female with medical history detailed above and active medical issues including:\par -Coronary calcium score reviewed.  Mild coronary calcification noted.  Evidence of atherosclerosis discussed.\par Again reviewed improved but still not at goal LDL cholesterol.  Discussed role of higher dose of rosuvastatin 20 mg and moderate to intensified dosing schedule.  Understands risk benefits alternatives and options.  She does not want to pursue it.  She will get repeat labs in 6 months and think about it at that time.  She will continue with aggressive lifestyle and risk factor modifications.\par \par - Hypertension.  Essential.  No history of CHF.  No CKD.  Non-smoker.  Much better controlled.  Side effects reviewed.  Goal less than 130/80.\par Low-salt diet.  Lower alcohol intake to 1 drink a day instead of 2 drinks a day\par Continue to stay active.  Labs ordered.\par \par -Mixed dyslipidemia statin therapy as discussed above in presence of abnormal but low coronary calcium score\par \par -Vitamin D deficiency.  Vitamin D oral supplement recommended.  Continue to follow\par \par -Nocturnal occasional cough.  Possible reflux disease.  Reviewed reflux disease precautions.\par = Colitis.  Follow-up with GI\par -Fasting hyperglycemia.  Hemoglobin A1c recommended.  Low-carb diet.\par \par -Recommended to have primary care physician/gynecological follow-up.\par \par \par Counseling regarding low saturated fat, salt and carbohydrate intake was reviewed. Active lifestyle and regular. Exercise along with weight management is advised.\par All the above were at length reviewed. Answered all the questions. Thank you very much for this kind referral. Please do not hesitate to give me a call for any question.\par Part of this transcription was done with voice recognition software and phonetically similar errors are common. I apologize for that. Please do not hesitate to call for any questions due to above.\par \par Sincerely,\par Anika Hallman MD,FACC,GI\par

## 2023-06-26 NOTE — CARDIOLOGY SUMMARY
[de-identified] : December 28, 2022 normal sinus rhythm LHP poor R wave progression versus anterior infarct of undetermined age [de-identified] : April 8, 2021 LV ejection fraction 60 to 65% mild mitral regurgitation mildly dilated left atrium RVSP 16 [de-identified] : December 7, 2022.  Mild coronary calcification CCS score 36.  Mild fibrocalcific disease of mitral valve. [de-identified] : Bilateral carotid Doppler study April 8, 2021.  Mild nonobstructive disease\par Abdominal aortic ultrasound April 8, 2021 no significant aneurysm.

## 2023-06-26 NOTE — HISTORY OF PRESENT ILLNESS
[FreeTextEntry1] : Kathrin is a 67-year-old female with history of hypertension on amlodipine and Toprol, chronic hip pain left NASIMA September 2018, scoliosis.  Next dyslipidemia not on statin therapy.  History of ulcerative colitis.  Improved with the last colonoscopy.  Coronary calcification mild at 36\par \par Cardiovascular review of symptoms is negative for exertional chest pain, dyspnea, palpitations, dizziness or syncope.  No PND or orthopnea leg edema.  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

## 2023-07-28 ENCOUNTER — NON-APPOINTMENT (OUTPATIENT)
Age: 68
End: 2023-07-28

## 2023-07-28 DIAGNOSIS — Z78.9 OTHER SPECIFIED HEALTH STATUS: ICD-10-CM

## 2023-08-09 ENCOUNTER — NON-APPOINTMENT (OUTPATIENT)
Age: 68
End: 2023-08-09

## 2023-08-11 ENCOUNTER — NON-APPOINTMENT (OUTPATIENT)
Age: 68
End: 2023-08-11

## 2023-09-25 ENCOUNTER — APPOINTMENT (OUTPATIENT)
Dept: CARDIOLOGY | Facility: CLINIC | Age: 68
End: 2023-09-25

## 2023-10-10 ENCOUNTER — APPOINTMENT (OUTPATIENT)
Dept: NEUROSURGERY | Facility: CLINIC | Age: 68
End: 2023-10-10
Payer: MEDICARE

## 2023-10-10 PROCEDURE — 99204 OFFICE O/P NEW MOD 45 MIN: CPT

## 2023-10-11 ENCOUNTER — APPOINTMENT (OUTPATIENT)
Dept: MRI IMAGING | Facility: CLINIC | Age: 68
End: 2023-10-11
Payer: MEDICARE

## 2023-10-11 PROCEDURE — 72148 MRI LUMBAR SPINE W/O DYE: CPT

## 2023-10-31 ENCOUNTER — APPOINTMENT (OUTPATIENT)
Dept: NEUROSURGERY | Facility: CLINIC | Age: 68
End: 2023-10-31
Payer: MEDICARE

## 2023-10-31 DIAGNOSIS — M54.50 LOW BACK PAIN, UNSPECIFIED: ICD-10-CM

## 2023-10-31 DIAGNOSIS — M79.605 LOW BACK PAIN, UNSPECIFIED: ICD-10-CM

## 2023-10-31 PROCEDURE — 99204 OFFICE O/P NEW MOD 45 MIN: CPT

## 2024-01-16 RX ORDER — AMLODIPINE BESYLATE 10 MG/1
10 TABLET ORAL
Qty: 90 | Refills: 3 | Status: ACTIVE | COMMUNITY
Start: 2017-05-16 | End: 1900-01-01

## 2024-01-22 RX ORDER — METOPROLOL SUCCINATE 25 MG/1
25 TABLET, EXTENDED RELEASE ORAL DAILY
Qty: 90 | Refills: 3 | Status: ACTIVE | COMMUNITY
Start: 2017-05-16 | End: 1900-01-01

## 2024-02-26 NOTE — PHYSICAL EXAM
[Well Developed] : well developed [Well Nourished] : well nourished [No Acute Distress] : no acute distress [No Carotid Bruit] : no carotid bruit [Normal Venous Pressure] : normal venous pressure [Normal S1, S2] : normal S1, S2 [No Murmur] : no murmur [No Rub] : no rub [No Gallop] : no gallop [Clear Lung Fields] : clear lung fields [Good Air Entry] : good air entry [No Respiratory Distress] : no respiratory distress  [Normal Gait] : normal gait [No Edema] : no edema [No Cyanosis] : no cyanosis [No Clubbing] : no clubbing [Normal Radial B/L] : normal radial B/L [Moves all extremities] : moves all extremities [Normal Speech] : normal speech [Alert and Oriented] : alert and oriented

## 2024-02-27 ENCOUNTER — APPOINTMENT (OUTPATIENT)
Dept: CARDIOLOGY | Facility: CLINIC | Age: 69
End: 2024-02-27

## 2024-02-27 NOTE — HISTORY OF PRESENT ILLNESS
[FreeTextEntry1] : Kathrin is a 67-year-old female with history of hypertension on amlodipine and Toprol, chronic hip pain left NASIMA September 2018, scoliosis.  Next dyslipidemia not on statin therapy.  History of ulcerative colitis.  Improved with the last colonoscopy.  Coronary calcification mild at 36\par  \par  Cardiovascular review of symptoms is negative for exertional chest pain, dyspnea, palpitations, dizziness or syncope.  No PND or orthopnea leg edema.  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

## 2024-02-27 NOTE — CARDIOLOGY SUMMARY
[de-identified] : December 28, 2022 normal sinus rhythm LHP poor R wave progression versus anterior infarct of undetermined age [de-identified] : April 8, 2021 LV ejection fraction 60 to 65% mild mitral regurgitation mildly dilated left atrium RVSP 16 [de-identified] : Bilateral carotid Doppler study April 8, 2021.  Mild nonobstructive disease\par  Abdominal aortic ultrasound April 8, 2021 no significant aneurysm. [de-identified] : December 7, 2022.  Mild coronary calcification CCS score 36.  Mild fibrocalcific disease of mitral valve.

## 2024-04-01 ENCOUNTER — APPOINTMENT (OUTPATIENT)
Dept: CARDIOLOGY | Facility: CLINIC | Age: 69
End: 2024-04-01
Payer: MEDICARE

## 2024-04-01 ENCOUNTER — NON-APPOINTMENT (OUTPATIENT)
Age: 69
End: 2024-04-01

## 2024-04-01 VITALS — SYSTOLIC BLOOD PRESSURE: 150 MMHG | DIASTOLIC BLOOD PRESSURE: 80 MMHG

## 2024-04-01 VITALS
WEIGHT: 126 LBS | HEIGHT: 60 IN | SYSTOLIC BLOOD PRESSURE: 178 MMHG | OXYGEN SATURATION: 98 % | DIASTOLIC BLOOD PRESSURE: 90 MMHG | BODY MASS INDEX: 24.74 KG/M2 | HEART RATE: 90 BPM

## 2024-04-01 DIAGNOSIS — I25.84 ATHEROSCLEROTIC HEART DISEASE OF NATIVE CORONARY ARTERY W/OUT ANGINA PECTORIS: ICD-10-CM

## 2024-04-01 DIAGNOSIS — R94.31 ABNORMAL ELECTROCARDIOGRAM [ECG] [EKG]: ICD-10-CM

## 2024-04-01 DIAGNOSIS — I25.10 ATHEROSCLEROTIC HEART DISEASE OF NATIVE CORONARY ARTERY W/OUT ANGINA PECTORIS: ICD-10-CM

## 2024-04-01 DIAGNOSIS — I65.23 OCCLUSION AND STENOSIS OF BILATERAL CAROTID ARTERIES: ICD-10-CM

## 2024-04-01 PROCEDURE — 93000 ELECTROCARDIOGRAM COMPLETE: CPT

## 2024-04-01 PROCEDURE — G2211 COMPLEX E/M VISIT ADD ON: CPT

## 2024-04-01 PROCEDURE — 99214 OFFICE O/P EST MOD 30 MIN: CPT

## 2024-04-01 RX ORDER — CELECOXIB 200 MG/1
200 CAPSULE ORAL
Qty: 30 | Refills: 5 | Status: DISCONTINUED | COMMUNITY
Start: 2023-11-01 | End: 2024-04-01

## 2024-04-01 RX ORDER — OXYCODONE AND ACETAMINOPHEN 5; 325 MG/1; MG/1
5-325 TABLET ORAL
Qty: 30 | Refills: 0 | Status: DISCONTINUED | COMMUNITY
Start: 2023-10-10 | End: 2024-04-01

## 2024-04-01 RX ORDER — OXYCODONE 5 MG/1
5 TABLET ORAL
Qty: 45 | Refills: 0 | Status: DISCONTINUED | COMMUNITY
Start: 2023-10-16 | End: 2024-04-01

## 2024-04-01 RX ORDER — ASPIRIN 81 MG/1
81 TABLET ORAL
Refills: 0 | Status: ACTIVE | COMMUNITY
Start: 2024-04-01

## 2024-04-01 RX ORDER — FOLIC ACID/MULTIVIT,IRON,MINER 0.4MG-18MG
TABLET ORAL
Refills: 0 | Status: DISCONTINUED | COMMUNITY
End: 2024-04-01

## 2024-04-01 RX ORDER — ROSUVASTATIN CALCIUM 10 MG/1
10 TABLET, FILM COATED ORAL
Qty: 90 | Refills: 3 | Status: DISCONTINUED | COMMUNITY
Start: 2022-12-28 | End: 2024-04-01

## 2024-04-01 NOTE — CARDIOLOGY SUMMARY
[de-identified] : December 28, 2022 normal sinus rhythm LHP poor R wave progression versus anterior infarct of undetermined age 4/1/24 normal sinus rhythm.  Left anterior hemiblock.  Poor R wave progression versus anterior infarct of undetermined age. [de-identified] : Nuclear marker perfusion scan.  2018.  Nonischemic. [de-identified] : April 8, 2021 LV ejection fraction 60 to 65% mild mitral regurgitation mildly dilated left atrium RVSP 16 [de-identified] : December 7, 2022.  Mild coronary calcification CCS score 36.  Mild fibrocalcific disease of mitral valve. [de-identified] : Bilateral carotid Doppler study April 8, 2021.  Mild nonobstructive disease\par  Abdominal aortic ultrasound April 8, 2021 no significant aneurysm.

## 2024-04-01 NOTE — PHYSICAL EXAM
[Well Developed] : well developed [Well Nourished] : well nourished [No Acute Distress] : no acute distress [Normal Venous Pressure] : normal venous pressure [Normal S1, S2] : normal S1, S2 [No Carotid Bruit] : no carotid bruit [No Rub] : no rub [No Murmur] : no murmur [No Gallop] : no gallop [Clear Lung Fields] : clear lung fields [Good Air Entry] : good air entry [No Respiratory Distress] : no respiratory distress  [No Edema] : no edema [Normal Gait] : normal gait [No Clubbing] : no clubbing [No Cyanosis] : no cyanosis [Normal Speech] : normal speech [Normal Radial B/L] : normal radial B/L [Moves all extremities] : moves all extremities [Alert and Oriented] : alert and oriented [Murmur] : murmur [de-identified] : msm

## 2024-04-01 NOTE — HISTORY OF PRESENT ILLNESS
[FreeTextEntry1] : Kathrin is a 68 year-old female with history of hypertension on amlodipine and Toprol, chronic hip pain left NASIMA September 2018, scoliosis. dyslipidemia not on statins due to rosuvastatin giving her mylagia though she baseline OA.  History of ulcerative colitis.  Improved with the last colonoscopy.  Coronary calcification mild at 35 She is here today with complaint of intermittent substernal chest pressure mainly at nighttime.  Not during the daytime and during activity.  And left arm achiness.  Left arm achiness gets worse with position.  Nocturnal substernal chest pressure does not get better or worse with position or meals.  There is no other associated symptoms.  His pain for last 2 weeks or so.  According to her and she has significantly increased stress. She is taking Advil intermittently.  Her ulcerative colitis has resolved at present.  Cardiovascular review of symptoms is negative for exertional chest pain, dyspnea, palpitations, dizziness or syncope.  No PND or orthopnea leg edema.  No bleeding or black stool. 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

## 2024-04-01 NOTE — ASSESSMENT
[FreeTextEntry1] : Reviewed on June 26, 2023. Labs from May 26, 2023 reviewed.    CMP stable.  Vitamin D level low  Reviewed April 1, 2024.   HDL 58 CMP stable

## 2024-04-01 NOTE — DISCUSSION/SUMMARY
[FreeTextEntry1] : Kathrin is a 68-year-old female with medical history detailed above and active medical issues including:  - Substernal nocturnal chest pain.  Cardiovascular versus gastrointestinal etiologies were reviewed.  Equal pulses bilaterally.  Elevated blood pressure initially though improved after resting.  Significant stressful event just prior to coming to the office.  Blood pressure otherwise is well-controlled.  EKG no new acute ischemic changes though has baseline abnormal EKG.  Mild coronary calcification on a coronary CT scan in the past.  Last evaluation about 6 years ago.  Recommended stress myocardial perfusion scan on medication and echocardiogram for further evaluation ruling out any significant obstructive CAD as etiology.  Risk benefits alternatives of the test was reviewed. Technetium 99 radioisotope use was reviewed.  Risks, benefits, alternatives of use of radioisotope was discussed at length.  Patient verbalized understanding and agreed with the management plan. Reviewed therapeutic trial of PPI for possible gastrointestinal etiology.  Which she declines.  If nonischemic myocardial perfusion scan then to consider PPI use.  And gastrointestinal etiology evaluation. -Coronary calcium score reviewed.  Mild coronary calcification noted.  Evidence of atherosclerosis discussed. Again reviewed improved but still not at goal LDL cholesterol.  Discussed trial of atorvastatin 10 mg.  Understands risk benefits alternatives and options.  If tolerated she will have labs.  Otherwise we will have to change it to PCSK9 inhibitor.  She will continue with aggressive lifestyle and risk factor modifications.  - Hypertension.  Essential. uncontrolled.   No history of CHF.  No CKD.  Non-smoker. Goal less than 130/80.  Relationship with stress reviewed.  Considering improving blood pressure with resting she will check blood pressure at home and decide about further adjustment of medication based on that. Low-salt diet.  Lower alcohol intake to 1 drink a day instead of 2 drinks a day Continue to stay active.  Labs ordered.  -Mixed dyslipidemia statin therapy as discussed above in presence of abnormal but low coronary calcium score  -Vitamin D deficiency.  Vitamin D oral supplement recommended.  Continue to follow  -Recommended to have primary care physician/gynecological follow-up.   Counseling regarding low saturated fat, salt and carbohydrate intake was reviewed. Active lifestyle and regular. Exercise along with weight management is advised. All the above were at length reviewed. Answered all the questions. Thank you very much for this kind referral. Please do not hesitate to give me a call for any question. Part of this transcription was done with voice recognition software and phonetically similar errors are common. I apologize for that. Please do not hesitate to call for any questions due to above.  Sincerely, Anika Hallman MD,FACC,FASE  [EKG obtained to assist in diagnosis and management of assessed problem(s)] : EKG obtained to assist in diagnosis and management of assessed problem(s)

## 2024-04-23 ENCOUNTER — APPOINTMENT (OUTPATIENT)
Dept: CARDIOLOGY | Facility: CLINIC | Age: 69
End: 2024-04-23
Payer: MEDICARE

## 2024-04-23 PROCEDURE — 93306 TTE W/DOPPLER COMPLETE: CPT

## 2024-04-25 ENCOUNTER — APPOINTMENT (OUTPATIENT)
Dept: CARDIOLOGY | Facility: CLINIC | Age: 69
End: 2024-04-25
Payer: MEDICARE

## 2024-04-25 DIAGNOSIS — I47.29 OTHER VENTRICULAR TACHYCARDIA: ICD-10-CM

## 2024-04-25 PROCEDURE — 78452 HT MUSCLE IMAGE SPECT MULT: CPT

## 2024-04-25 PROCEDURE — 93015 CV STRESS TEST SUPVJ I&R: CPT

## 2024-04-25 PROCEDURE — A9502: CPT

## 2024-04-29 ENCOUNTER — APPOINTMENT (OUTPATIENT)
Dept: CARDIOLOGY | Facility: CLINIC | Age: 69
End: 2024-04-29
Payer: MEDICARE

## 2024-04-29 VITALS
WEIGHT: 124 LBS | HEART RATE: 70 BPM | OXYGEN SATURATION: 97 % | SYSTOLIC BLOOD PRESSURE: 122 MMHG | BODY MASS INDEX: 24.22 KG/M2 | DIASTOLIC BLOOD PRESSURE: 76 MMHG

## 2024-04-29 DIAGNOSIS — M79.10 MYALGIA, UNSPECIFIED SITE: ICD-10-CM

## 2024-04-29 DIAGNOSIS — I35.0 NONRHEUMATIC AORTIC (VALVE) STENOSIS: ICD-10-CM

## 2024-04-29 DIAGNOSIS — I25.118 ATHEROSCLEROTIC HEART DISEASE OF NATIVE CORONARY ARTERY WITH OTHER FORMS OF ANGINA PECTORIS: ICD-10-CM

## 2024-04-29 DIAGNOSIS — E78.2 MIXED HYPERLIPIDEMIA: ICD-10-CM

## 2024-04-29 DIAGNOSIS — I11.9 HYPERTENSIVE HEART DISEASE W/OUT HEART FAILURE: ICD-10-CM

## 2024-04-29 PROCEDURE — G2211 COMPLEX E/M VISIT ADD ON: CPT

## 2024-04-29 PROCEDURE — 99214 OFFICE O/P EST MOD 30 MIN: CPT

## 2024-04-29 RX ORDER — MULTIVITAMIN
TABLET ORAL
Refills: 0 | Status: ACTIVE | COMMUNITY
Start: 2024-04-29

## 2024-04-29 RX ORDER — ATORVASTATIN CALCIUM 10 MG/1
10 TABLET, FILM COATED ORAL
Refills: 0 | Status: DISCONTINUED | COMMUNITY
End: 2024-04-29

## 2024-04-29 RX ORDER — ATORVASTATIN CALCIUM 10 MG/1
10 TABLET, FILM COATED ORAL
Qty: 90 | Refills: 3 | Status: ACTIVE | COMMUNITY
Start: 2024-04-29 | End: 1900-01-01

## 2024-04-29 NOTE — DISCUSSION/SUMMARY
[FreeTextEntry1] : Kathrin is a 68-year-old female with medical history detailed above and active medical issues including:  - Substernal nocturnal chest pain.  Cardiovascular versus gastrointestinal etiologies were reviewed.  Echocardiogram preserved EF.  Stress myocardial perfusion scan without any inducible chest pain.  Nonischemic perfusion scan at Lower workload.  Reviewed limitation of the test.  Lower probability of high risk obstructive CAD reviewed.  Recommended more aggressive approach and lipid-lowering.  Could not tolerate rosuvastatin in the past.  After explaining options which includes trial of atorvastatin versus PCSK9 inhibitor at present decided to try atorvastatin and see if she can tolerate it.  If there are significant side effects she will stop it and let us know in that case she will benefit from PCSK9 inhibitors.. -Coronary calcium score reviewed.  Mild coronary calcification noted.  Evidence of atherosclerosis discussed. Again reviewed improved but still not at goal LDL cholesterol.  Discussed trial of atorvastatin 10 mg.  Understands risk benefits alternatives and options.  If tolerated she will have labs.  Otherwise we will have to change it to PCSK9 inhibitor.  She will continue with aggressive lifestyle and risk factor modifications.  - Hypertension.  Essential. stable. well controlled.   No history of CHF.  No CKD.  Non-smoker. Goal less than 130/80.  Relationship with stress reviewed.  Considering improving blood pressure with resting she will check blood pressure at home and decide about further adjustment of medication based on that. Low-salt diet.  Lower alcohol intake to 1 drink a day instead of 2 drinks a day Continue to stay active.  Labs ordered.  -Mixed dyslipidemia statin therapy as discussed above in presence of abnormal but low coronary calcium score  -Vitamin D deficiency.  Vitamin D oral supplement recommended.  Continue to follow  -Recommended to have primary care physician/gynecological follow-up.   Counseling regarding low saturated fat, salt and carbohydrate intake was reviewed. Active lifestyle and regular. Exercise along with weight management is advised. All the above were at length reviewed. Answered all the questions. Thank you very much for this kind referral. Please do not hesitate to give me a call for any question. Part of this transcription was done with voice recognition software and phonetically similar errors are common. I apologize for that. Please do not hesitate to call for any questions due to above.  Sincerely, Anika Hallman MD,[facility],GI

## 2024-04-29 NOTE — ASSESSMENT
[FreeTextEntry1] : Reviewed on June 26, 2023. Labs from May 26, 2023 reviewed.    CMP stable.  Vitamin D level low  Reviewed April 1, 2024.   HDL 58 CMP stable  Reviewed on April 29, 2024. Echocardiogram and nuclear myocardial perfusion scan reviewed.

## 2024-04-29 NOTE — PHYSICAL EXAM
[Well Developed] : well developed [Well Nourished] : well nourished [No Acute Distress] : no acute distress [Normal Venous Pressure] : normal venous pressure [No Carotid Bruit] : no carotid bruit [Normal S1, S2] : normal S1, S2 [No Rub] : no rub [No Gallop] : no gallop [Murmur] : murmur [Clear Lung Fields] : clear lung fields [Good Air Entry] : good air entry [No Respiratory Distress] : no respiratory distress  [Normal Gait] : normal gait [No Edema] : no edema [No Cyanosis] : no cyanosis [No Clubbing] : no clubbing [Normal Radial B/L] : normal radial B/L [Normal Speech] : normal speech [Alert and Oriented] : alert and oriented [de-identified] : Blood pressure 122/76 right upper extremity sitting, heart rate 70, weight 224 pounds, 97% saturation [de-identified] : msm

## 2024-04-29 NOTE — HISTORY OF PRESENT ILLNESS
[FreeTextEntry1] : Kathrin is a 68 year-old female with history of hypertension on amlodipine and Toprol, chronic hip pain left NASIMA September 2018, scoliosis. dyslipidemia not on statins due to rosuvastatin giving her mylagia though she baseline OA.  History of ulcerative colitis.  Improved with the last colonoscopy.  Coronary calcification mild at 35 She is here today with complaint of intermittent substernal chest pressure mainly at nighttime.  Which has resolved.  Here to review her echocardiogram and nuclear myocardial perfusion scan. Not during the daytime and during activity.  And left arm achiness.  Left arm achiness gets worse with position.  She is taking Advil intermittently.  Her ulcerative colitis has resolved at present.  Cardiovascular review of symptoms is negative for exertional chest pain, dyspnea, palpitations, dizziness or syncope.  No PND or orthopnea leg edema.  No bleeding or black stool. 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

## 2024-04-29 NOTE — CARDIOLOGY SUMMARY
[de-identified] : December 28, 2022 normal sinus rhythm LHP poor R wave progression versus anterior infarct of undetermined age 4/1/24 normal sinus rhythm.  Left anterior hemiblock.  Poor R wave progression versus anterior infarct of undetermined age. [de-identified] : Nuclear mocardial perfusion scan.  2018.  Nonischemic. Stress myocardial perfusion scan.  April 25, 2024.  Nonischemia.  4 minutes of Imer protocol 5 minutes.  Low level of exercise no chest pain. [de-identified] : April 8, 2021 LV ejection fraction 60 to 65% mild mitral regurgitation mildly dilated left atrium RVSP 16 April 23, 2024.  LVEF 63%.  Mild MR.  Mild aortic stenosis.  Mild TR. [de-identified] : December 7, 2022.  Mild coronary calcification CCS score 36.  Mild fibrocalcific disease of mitral valve.   [de-identified] : Bilateral carotid Doppler study April 8, 2021.  Mild nonobstructive disease\par  Abdominal aortic ultrasound April 8, 2021 no significant aneurysm.

## 2024-05-31 ENCOUNTER — APPOINTMENT (OUTPATIENT)
Dept: ULTRASOUND IMAGING | Facility: CLINIC | Age: 69
End: 2024-05-31
Payer: MEDICARE

## 2024-05-31 PROCEDURE — 93971 EXTREMITY STUDY: CPT | Mod: LT

## 2024-07-30 RX ORDER — ATORVASTATIN CALCIUM 20 MG/1
20 TABLET, FILM COATED ORAL
Qty: 90 | Refills: 1 | Status: ACTIVE | COMMUNITY
Start: 2024-07-29 | End: 1900-01-01

## 2024-07-31 RX ORDER — DOXYCYCLINE 100 MG/1
100 CAPSULE ORAL
Qty: 42 | Refills: 0 | Status: ACTIVE | COMMUNITY
Start: 1900-01-01 | End: 1900-01-01

## 2024-08-07 ENCOUNTER — NON-APPOINTMENT (OUTPATIENT)
Age: 69
End: 2024-08-07

## 2024-11-25 ENCOUNTER — APPOINTMENT (OUTPATIENT)
Dept: CARDIOLOGY | Facility: CLINIC | Age: 69
End: 2024-11-25
Payer: MEDICARE

## 2024-11-25 VITALS
HEART RATE: 72 BPM | WEIGHT: 118 LBS | DIASTOLIC BLOOD PRESSURE: 62 MMHG | SYSTOLIC BLOOD PRESSURE: 122 MMHG | HEIGHT: 60 IN | BODY MASS INDEX: 23.16 KG/M2 | OXYGEN SATURATION: 96 %

## 2024-11-25 DIAGNOSIS — M79.10 MYALGIA, UNSPECIFIED SITE: ICD-10-CM

## 2024-11-25 DIAGNOSIS — I11.9 HYPERTENSIVE HEART DISEASE W/OUT HEART FAILURE: ICD-10-CM

## 2024-11-25 DIAGNOSIS — I10 ESSENTIAL (PRIMARY) HYPERTENSION: ICD-10-CM

## 2024-11-25 DIAGNOSIS — E78.2 MIXED HYPERLIPIDEMIA: ICD-10-CM

## 2024-11-25 DIAGNOSIS — I25.10 ATHEROSCLEROTIC HEART DISEASE OF NATIVE CORONARY ARTERY W/OUT ANGINA PECTORIS: ICD-10-CM

## 2024-11-25 PROCEDURE — 99214 OFFICE O/P EST MOD 30 MIN: CPT

## 2025-02-26 ENCOUNTER — NON-APPOINTMENT (OUTPATIENT)
Age: 70
End: 2025-02-26

## 2025-05-22 ENCOUNTER — APPOINTMENT (OUTPATIENT)
Dept: CARDIOLOGY | Facility: CLINIC | Age: 70
End: 2025-05-22
Payer: MEDICARE

## 2025-05-22 VITALS
OXYGEN SATURATION: 97 % | HEART RATE: 71 BPM | WEIGHT: 116 LBS | DIASTOLIC BLOOD PRESSURE: 84 MMHG | BODY MASS INDEX: 22.66 KG/M2 | SYSTOLIC BLOOD PRESSURE: 128 MMHG

## 2025-05-22 DIAGNOSIS — I11.9 HYPERTENSIVE HEART DISEASE W/OUT HEART FAILURE: ICD-10-CM

## 2025-05-22 DIAGNOSIS — I35.0 NONRHEUMATIC AORTIC (VALVE) STENOSIS: ICD-10-CM

## 2025-05-22 DIAGNOSIS — E78.2 MIXED HYPERLIPIDEMIA: ICD-10-CM

## 2025-05-22 DIAGNOSIS — I25.10 ATHEROSCLEROTIC HEART DISEASE OF NATIVE CORONARY ARTERY W/OUT ANGINA PECTORIS: ICD-10-CM

## 2025-05-22 PROCEDURE — 99214 OFFICE O/P EST MOD 30 MIN: CPT

## 2025-05-23 ENCOUNTER — APPOINTMENT (OUTPATIENT)
Dept: ULTRASOUND IMAGING | Facility: CLINIC | Age: 70
End: 2025-05-23
Payer: MEDICARE

## 2025-05-23 PROCEDURE — 93971 EXTREMITY STUDY: CPT | Mod: LT

## 2025-06-05 ENCOUNTER — APPOINTMENT (OUTPATIENT)
Dept: RADIOLOGY | Facility: CLINIC | Age: 70
End: 2025-06-05
Payer: MEDICARE

## 2025-06-05 PROCEDURE — 77080 DXA BONE DENSITY AXIAL: CPT

## 2025-08-26 ENCOUNTER — APPOINTMENT (OUTPATIENT)
Dept: MRI IMAGING | Facility: CLINIC | Age: 70
End: 2025-08-26
Payer: MEDICARE

## 2025-08-26 PROCEDURE — 73721 MRI JNT OF LWR EXTRE W/O DYE: CPT | Mod: LT
